# Patient Record
Sex: FEMALE | Race: OTHER | Employment: OTHER | URBAN - METROPOLITAN AREA
[De-identification: names, ages, dates, MRNs, and addresses within clinical notes are randomized per-mention and may not be internally consistent; named-entity substitution may affect disease eponyms.]

---

## 2020-01-13 NOTE — PATIENT DISCUSSION
CATARACTS, OU - VISUALLY SIGNIFICANT. PT TO CALL WHEN READY TO PROCEED WITH SURGERY. SCHEDULE   IF VISUAL SYMPTOMS PERSIST.

## 2022-03-17 ENCOUNTER — NEW PATIENT (OUTPATIENT)
Dept: URBAN - METROPOLITAN AREA CLINIC 36 | Facility: CLINIC | Age: 82
End: 2022-03-17

## 2022-03-17 DIAGNOSIS — H43.813: ICD-10-CM

## 2022-03-17 DIAGNOSIS — H04.123: ICD-10-CM

## 2022-03-17 DIAGNOSIS — H52.7: ICD-10-CM

## 2022-03-17 DIAGNOSIS — H26.493: ICD-10-CM

## 2022-03-17 DIAGNOSIS — Z96.1: ICD-10-CM

## 2022-03-17 PROCEDURE — 99199RRD RESIDENT RENDERING PROVIDER

## 2022-03-17 PROCEDURE — 92004 COMPRE OPH EXAM NEW PT 1/>: CPT

## 2022-03-17 PROCEDURE — 92015 DETERMINE REFRACTIVE STATE: CPT

## 2022-03-17 ASSESSMENT — VISUAL ACUITY
OD_CC: J1
OS_CC: J1
OS_SC: 20/40-2
OS_CC: 20/25
OD_SC: 20/25+2
OD_SC: J4
OS_SC: J4
OD_CC: 20/20-2

## 2022-03-17 ASSESSMENT — TONOMETRY
OS_IOP_MMHG: 15
OD_IOP_MMHG: 14

## 2024-03-21 ENCOUNTER — COMPREHENSIVE EXAM (OUTPATIENT)
Dept: URBAN - METROPOLITAN AREA CLINIC 36 | Facility: CLINIC | Age: 84
End: 2024-03-21

## 2024-03-21 DIAGNOSIS — H04.123: ICD-10-CM

## 2024-03-21 DIAGNOSIS — Z96.1: ICD-10-CM

## 2024-03-21 DIAGNOSIS — H26.493: ICD-10-CM

## 2024-03-21 PROCEDURE — 99214 OFFICE O/P EST MOD 30 MIN: CPT

## 2024-03-21 PROCEDURE — 92015 DETERMINE REFRACTIVE STATE: CPT

## 2024-03-21 ASSESSMENT — VISUAL ACUITY
OS_SC: 20/30
OD_SC: 20/25
OS_SC: J8
OD_SC: J6
OD_CC: 20/20-1
OS_CC: J1
OS_CC: 20/30
OD_CC: J1

## 2024-03-21 ASSESSMENT — TONOMETRY
OD_IOP_MMHG: 14
OS_IOP_MMHG: 15

## 2025-07-11 ENCOUNTER — COMPREHENSIVE EXAM (OUTPATIENT)
Age: 85
End: 2025-07-11

## 2025-07-11 DIAGNOSIS — H43.813: ICD-10-CM

## 2025-07-11 DIAGNOSIS — Z96.1: ICD-10-CM

## 2025-07-11 DIAGNOSIS — H52.7: ICD-10-CM

## 2025-07-11 DIAGNOSIS — H26.493: ICD-10-CM

## 2025-07-11 DIAGNOSIS — H04.123: ICD-10-CM

## 2025-07-11 PROCEDURE — 92015 DETERMINE REFRACTIVE STATE: CPT

## 2025-07-11 PROCEDURE — 92014 COMPRE OPH EXAM EST PT 1/>: CPT
